# Patient Record
Sex: MALE | Race: WHITE | NOT HISPANIC OR LATINO | URBAN - METROPOLITAN AREA
[De-identification: names, ages, dates, MRNs, and addresses within clinical notes are randomized per-mention and may not be internally consistent; named-entity substitution may affect disease eponyms.]

---

## 2017-05-20 ENCOUNTER — EMERGENCY (EMERGENCY)
Facility: HOSPITAL | Age: 47
LOS: 1 days | Discharge: HOME | End: 2017-05-20
Attending: EMERGENCY MEDICINE | Admitting: EMERGENCY MEDICINE
Payer: OTHER MISCELLANEOUS

## 2017-05-20 VITALS
HEART RATE: 77 BPM | HEIGHT: 74 IN | TEMPERATURE: 98 F | DIASTOLIC BLOOD PRESSURE: 90 MMHG | OXYGEN SATURATION: 100 % | SYSTOLIC BLOOD PRESSURE: 138 MMHG | WEIGHT: 195.11 LBS | RESPIRATION RATE: 20 BRPM
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
## 2017-05-20 DIAGNOSIS — M25.512 PAIN IN LEFT SHOULDER: ICD-10-CM

## 2017-05-20 DIAGNOSIS — S43.402A UNSPECIFIED SPRAIN OF LEFT SHOULDER JOINT, INITIAL ENCOUNTER: ICD-10-CM
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
## 2017-05-20 PROCEDURE — 73080 X-RAY EXAM OF ELBOW: CPT | Mod: 26,LT

## 2017-05-20 PROCEDURE — 99284 EMERGENCY DEPT VISIT MOD MDM: CPT

## 2017-05-20 PROCEDURE — 73030 X-RAY EXAM OF SHOULDER: CPT | Mod: 26,RT

## 2017-05-20 NOTE — ED PROVIDER NOTE - MEDICAL DECISION MAKING DETAILS
patient presents with L shoulder pain after fall while at work. xrays negative. advised rest, NSAIDs and follow up with PCP

## 2017-05-20 NOTE — ED ADULT TRIAGE NOTE - CHIEF COMPLAINT QUOTE
Received patient as walkin with complaints of right shoulder pain s/p trip and fall at work today. Believes it may be dislocated. No hx shoulder dislocation in the past.

## 2017-05-20 NOTE — ED PROVIDER NOTE - OBJECTIVE STATEMENT
patient presents with L shoulder and L elbow after injury at work today. denies, head injury, LOC, dizziness. admits to pain on ROM of shoulder

## 2017-05-20 NOTE — ED ADULT NURSE NOTE - OBJECTIVE STATEMENT
s/p fall; stuck out arms to break fall and "heard pop" on right side; c/o right shoulder pain; unable to lift arm